# Patient Record
(demographics unavailable — no encounter records)

---

## 2024-10-23 NOTE — DISCUSSION/SUMMARY
[de-identified] : Zay has a complex issue with his left knee. He has an ACL tear with concomitant tears of both meniscii. He would benefit from ACL reconstruction with meniscal repairs. He has a family h,o arthrofibrosis which would be a concern in meniscal repair. He also has some signal change in his patella tendon which would make a quad tendon a more preferable graft choice. He will consult with Dr Dumas regarding a second opinion on the quad tendon autograft. All questions were answered. He will call if any issues arise

## 2024-10-23 NOTE — PHYSICAL EXAM
[de-identified] : The patient is a well developed, well nourished male in no apparent distress. He is alert and oriented X 3 with a pleasant mood and appropriate affect.  On physical examination of the left knee, his ROM is 2-100 degrees The patient walks with an antalgic gait assisted by crutches and a brace. There is traceeffusion. No warmth or erythema is noted. The patella is non tender to palpation medially or laterally. There is no crepitus noted. The apprehension and grind tests are negative. The extensor mechanism is intact. There is no joint line tenderness elicited on palpation. The Denisse sign is negative. The Lachman and pivot shift tests are positive. There is no varus or valgus laxity at 0 or 30 degrees. No posterolateral or anteromedial laxity is noted. No masses are palpable. No other soft tissue or bony tenderness is noted. Quadriceps weakness is noted. Neurovascular function is intact. [de-identified] : MRI of the left knee show a complete tear of the ACL, with tears in the body of the lateral meniscus and posterior horn of the medial meniscus. There is so degeneration noted within the patella tendon

## 2024-10-23 NOTE — HISTORY OF PRESENT ILLNESS
[de-identified] : Francis Reyes is a 20 yo gentleman who presents today for evaluation of his left knee. He was playing football on 10/15 when he had a noncontact injury and felt a pop in his knee when making a catch. he was unable to bear weight. He was seen first in a local ER and then saw Dr Pelaez. He was sent for an MRI which shows an ACL tear, MMT and LMT> He just started PT. He has been walking with crutches and a brace. His knee feels weak and unstable. He denies any previous knee issues    left knee inujury 10/15--playing football--felt a pop, noncontact had MRI--acl, MMT LMT just started PT  in brace and on crutches

## 2024-10-23 NOTE — PHYSICAL EXAM
[de-identified] : The patient is a well developed, well nourished male in no apparent distress. He is alert and oriented X 3 with a pleasant mood and appropriate affect.  On physical examination of the left knee, his ROM is 2-100 degrees The patient walks with an antalgic gait assisted by crutches and a brace. There is traceeffusion. No warmth or erythema is noted. The patella is non tender to palpation medially or laterally. There is no crepitus noted. The apprehension and grind tests are negative. The extensor mechanism is intact. There is no joint line tenderness elicited on palpation. The Denisse sign is negative. The Lachman and pivot shift tests are positive. There is no varus or valgus laxity at 0 or 30 degrees. No posterolateral or anteromedial laxity is noted. No masses are palpable. No other soft tissue or bony tenderness is noted. Quadriceps weakness is noted. Neurovascular function is intact. [de-identified] : MRI of the left knee show a complete tear of the ACL, with tears in the body of the lateral meniscus and posterior horn of the medial meniscus. There is so degeneration noted within the patella tendon

## 2024-10-23 NOTE — HISTORY OF PRESENT ILLNESS
[de-identified] : Francis Reyes is a 18 yo gentleman who presents today for evaluation of his left knee. He was playing football on 10/15 when he had a noncontact injury and felt a pop in his knee when making a catch. he was unable to bear weight. He was seen first in a local ER and then saw Dr Pelaez. He was sent for an MRI which shows an ACL tear, MMT and LMT> He just started PT. He has been walking with crutches and a brace. His knee feels weak and unstable. He denies any previous knee issues    left knee inujury 10/15--playing football--felt a pop, noncontact had MRI--acl, MMT LMT just started PT  in brace and on crutches

## 2024-10-23 NOTE — END OF VISIT
[FreeTextEntry3] : .Dr Valentin has reviewed the chart and agrees that the record accurately reflects his personal performance of the history, physical exam, assessment, and plan. He personally directed, reviewed, and agreed with the chart.All medical record entries made by GEO Coelho, acting as a scribe for this encounter under the direction of Umair Valentin MD

## 2024-10-23 NOTE — DISCUSSION/SUMMARY
[de-identified] : Zay has a complex issue with his left knee. He has an ACL tear with concomitant tears of both meniscii. He would benefit from ACL reconstruction with meniscal repairs. He has a family h,o arthrofibrosis which would be a concern in meniscal repair. He also has some signal change in his patella tendon which would make a quad tendon a more preferable graft choice. He will consult with Dr Dumas regarding a second opinion on the quad tendon autograft. All questions were answered. He will call if any issues arise

## 2024-10-23 NOTE — HISTORY OF PRESENT ILLNESS
[de-identified] : Francis Reyes is a 18 yo gentleman who presents today for evaluation of his left knee. He was playing football on 10/15 when he had a noncontact injury and felt a pop in his knee when making a catch. he was unable to bear weight. He was seen first in a local ER and then saw Dr Pelaez. He was sent for an MRI which shows an ACL tear, MMT and LMT> He just started PT. He has been walking with crutches and a brace. His knee feels weak and unstable. He denies any previous knee issues    left knee inujury 10/15--playing football--felt a pop, noncontact had MRI--acl, MMT LMT just started PT  in brace and on crutches

## 2024-10-23 NOTE — DISCUSSION/SUMMARY
[de-identified] : Zay has a complex issue with his left knee. He has an ACL tear with concomitant tears of both meniscii. He would benefit from ACL reconstruction with meniscal repairs. He has a family h,o arthrofibrosis which would be a concern in meniscal repair. He also has some signal change in his patella tendon which would make a quad tendon a more preferable graft choice. He will consult with Dr Dumas regarding a second opinion on the quad tendon autograft. All questions were answered. He will call if any issues arise

## 2024-10-23 NOTE — PHYSICAL EXAM
[de-identified] : The patient is a well developed, well nourished male in no apparent distress. He is alert and oriented X 3 with a pleasant mood and appropriate affect.  On physical examination of the left knee, his ROM is 2-100 degrees The patient walks with an antalgic gait assisted by crutches and a brace. There is traceeffusion. No warmth or erythema is noted. The patella is non tender to palpation medially or laterally. There is no crepitus noted. The apprehension and grind tests are negative. The extensor mechanism is intact. There is no joint line tenderness elicited on palpation. The Denisse sign is negative. The Lachman and pivot shift tests are positive. There is no varus or valgus laxity at 0 or 30 degrees. No posterolateral or anteromedial laxity is noted. No masses are palpable. No other soft tissue or bony tenderness is noted. Quadriceps weakness is noted. Neurovascular function is intact. [de-identified] : MRI of the left knee show a complete tear of the ACL, with tears in the body of the lateral meniscus and posterior horn of the medial meniscus. There is so degeneration noted within the patella tendon